# Patient Record
Sex: MALE | Race: OTHER | URBAN - METROPOLITAN AREA
[De-identification: names, ages, dates, MRNs, and addresses within clinical notes are randomized per-mention and may not be internally consistent; named-entity substitution may affect disease eponyms.]

---

## 2017-08-14 ENCOUNTER — EMERGENCY (EMERGENCY)
Facility: HOSPITAL | Age: 52
LOS: 0 days | Discharge: HOME | End: 2017-08-14
Attending: EMERGENCY MEDICINE
Payer: SELF-PAY

## 2017-08-14 VITALS
OXYGEN SATURATION: 97 % | HEART RATE: 97 BPM | TEMPERATURE: 99 F | WEIGHT: 184.97 LBS | DIASTOLIC BLOOD PRESSURE: 95 MMHG | HEIGHT: 70 IN | SYSTOLIC BLOOD PRESSURE: 141 MMHG | RESPIRATION RATE: 20 BRPM

## 2017-08-14 DIAGNOSIS — Z88.9 ALLERGY STATUS TO UNSPECIFIED DRUGS, MEDICAMENTS AND BIOLOGICAL SUBSTANCES: ICD-10-CM

## 2017-08-14 DIAGNOSIS — F10.929 ALCOHOL USE, UNSPECIFIED WITH INTOXICATION, UNSPECIFIED: ICD-10-CM

## 2017-08-14 PROCEDURE — 99283 EMERGENCY DEPT VISIT LOW MDM: CPT

## 2017-08-14 NOTE — ED ADULT NURSE NOTE - OBJECTIVE STATEMENT
pt was found in his car covered with vomitus, brought in for intoxication , pt axox3 states he drank only 6 beers today with shrimp that made him to throw up , not in any distress, will monitor,.

## 2017-08-14 NOTE — ED PROVIDER NOTE - OBJECTIVE STATEMENT
53yo male with no pertinent pmh, BIBEMS, presents with alcohol intox. Pt was found by bystander sleeping in car with vomitus. Pt admits to drinking today. Pt denies any symptoms at present. Pt reports was waiting for child to pick him up. Pt is now AOx3, no trauma noted.     ROS: No fever/chills. No photophobia/eye pain/changes in vision, No ear pain/sore throat/dysphagia, No chest pain/palpitations. No SOB/cough/stridor. No abdominal pain, N/V/D, no black/bloody bm. No dysuria/frequency/discharge, No headache. No Dizziness.  No rash.  No numbness/tingling/weakness. 53yo male with no pertinent pmh, BIBEMS, presents with alcohol intox. Pt was found by bystander sleeping in car with vomitus. Pt admits to drinking today. Pt denies any symptoms at present. Pt reports was waiting for child to pick him up. Pt is now AOx3, no trauma noted. d/w son: [phone], states no safetey issues at home and will come pick pt up.    ROS: No fever/chills. No photophobia/eye pain/changes in vision, No ear pain/sore throat/dysphagia, No chest pain/palpitations. No SOB/cough/stridor. No abdominal pain, N/V/D, no black/bloody bm. No dysuria/frequency/discharge, No headache. No Dizziness.  No rash.  No numbness/tingling/weakness.

## 2017-08-14 NOTE — ED ADULT TRIAGE NOTE - CHIEF COMPLAINT QUOTE
Pt ETOH BIBA found sleeping in car covered  in vomit. Pt  states " I drank a few beers, I was waiting for my kid to pick me up."

## 2017-08-14 NOTE — ED ADULT NURSE NOTE - ED STAT RN HANDOFF DETAILS
pt stable , not in any distress, pt axox3, re-eval by md , discharged home, son sent in a taxi to pick him up , pt left ed in a stable condition via taxi.

## 2017-10-23 PROBLEM — Z00.00 ENCOUNTER FOR PREVENTIVE HEALTH EXAMINATION: Status: ACTIVE | Noted: 2017-10-23

## 2021-12-29 ENCOUNTER — APPOINTMENT (OUTPATIENT)
Dept: UROLOGY | Facility: CLINIC | Age: 56
End: 2021-12-29
Payer: MEDICAID

## 2021-12-29 DIAGNOSIS — Z78.9 OTHER SPECIFIED HEALTH STATUS: ICD-10-CM

## 2021-12-29 PROCEDURE — 99203 OFFICE O/P NEW LOW 30 MIN: CPT

## 2021-12-29 NOTE — ASSESSMENT
[FreeTextEntry1] : 55 yo male with DM presenting with phimosis, interested in circumcision. \par \par -- discussed indication, risks and benefits of circumcision\par -- I discussed the risks, benefits and alternatives to circumcision with the patient, including but not limited to bleeding, infection, pain, irritation of the glans, and penile injury.  I also discussed the need to refrain from sexual intercourse for 6 weeks, or until the circumcision incision heals.  The patient wishes to proceed and we will schedule the surgery for the near future.\par -- patient to obtain medical clearance from PCP \par -- plan for OR circumcision

## 2021-12-29 NOTE — HISTORY OF PRESENT ILLNESS
[FreeTextEntry1] : 54 year old male with phimosis presenting for initial visit for circumcision. He is uncircumcised and states for "the longest time" he has been having issues with pain and cracking of the foreskin. States it is worse with sexual intercourse. Shows picture of penis with band of foreskin that he states cuts off the circulation with erection. Denies issues with urination. Interested in circumcision. \par \par PMH: Diabetes, \par Medication: Metformin, ?blood thinner\par PSxH: none\par Allergies: none\par Denies tobacco use, occasional alcohol use. \par Presently not working, taking care of mom. \par Lives with mom. \par

## 2021-12-29 NOTE — PHYSICAL EXAM
[General Appearance - Well Developed] : well developed [General Appearance - Well Nourished] : well nourished [Normal Appearance] : normal appearance [Well Groomed] : well groomed [General Appearance - In No Acute Distress] : no acute distress [Edema] : no peripheral edema [Respiration, Rhythm And Depth] : normal respiratory rhythm and effort [Exaggerated Use Of Accessory Muscles For Inspiration] : no accessory muscle use [Abdomen Soft] : soft [Abdomen Tenderness] : non-tender [Costovertebral Angle Tenderness] : no ~M costovertebral angle tenderness [Urethral Meatus] : meatus normal [Urinary Bladder Findings] : the bladder was normal on palpation [Scrotum] : the scrotum was normal [Testes Mass (___cm)] : there were no testicular masses [No Prostate Nodules] : no prostate nodules [Normal Station and Gait] : the gait and station were normal for the patient's age [] : no rash [No Focal Deficits] : no focal deficits [Oriented To Time, Place, And Person] : oriented to person, place, and time [Affect] : the affect was normal [Mood] : the mood was normal [Not Anxious] : not anxious [No Palpable Adenopathy] : no palpable adenopathy [FreeTextEntry1] : uncircumcised, scarred phimotic foreskin

## 2022-01-04 ENCOUNTER — OUTPATIENT (OUTPATIENT)
Dept: OUTPATIENT SERVICES | Facility: HOSPITAL | Age: 57
LOS: 1 days | End: 2022-01-04
Payer: MEDICAID

## 2022-01-04 VITALS
DIASTOLIC BLOOD PRESSURE: 87 MMHG | SYSTOLIC BLOOD PRESSURE: 142 MMHG | HEART RATE: 99 BPM | TEMPERATURE: 98 F | OXYGEN SATURATION: 98 % | RESPIRATION RATE: 18 BRPM | WEIGHT: 194.01 LBS | HEIGHT: 70 IN

## 2022-01-04 DIAGNOSIS — N47.1 PHIMOSIS: ICD-10-CM

## 2022-01-04 DIAGNOSIS — Z01.818 ENCOUNTER FOR OTHER PREPROCEDURAL EXAMINATION: ICD-10-CM

## 2022-01-04 PROCEDURE — G0463: CPT

## 2022-01-04 PROCEDURE — 87086 URINE CULTURE/COLONY COUNT: CPT

## 2022-01-04 NOTE — H&P PST ADULT - HISTORY OF PRESENT ILLNESS
This is a 55 y/o male c/o phimosis, he presents today for circumcision  denies recent travel, sick contact, covid symptoms.    covid swab to be done  1/10 at [facility]

## 2022-01-05 LAB
CULTURE RESULTS: SIGNIFICANT CHANGE UP
SPECIMEN SOURCE: SIGNIFICANT CHANGE UP

## 2022-01-12 ENCOUNTER — TRANSCRIPTION ENCOUNTER (OUTPATIENT)
Age: 57
End: 2022-01-12

## 2022-01-13 ENCOUNTER — OUTPATIENT (OUTPATIENT)
Dept: OUTPATIENT SERVICES | Facility: HOSPITAL | Age: 57
LOS: 1 days | End: 2022-01-13
Payer: MEDICAID

## 2022-01-13 ENCOUNTER — RESULT REVIEW (OUTPATIENT)
Age: 57
End: 2022-01-13

## 2022-01-13 ENCOUNTER — APPOINTMENT (OUTPATIENT)
Dept: UROLOGY | Facility: HOSPITAL | Age: 57
End: 2022-01-13

## 2022-01-13 VITALS
DIASTOLIC BLOOD PRESSURE: 74 MMHG | OXYGEN SATURATION: 99 % | SYSTOLIC BLOOD PRESSURE: 118 MMHG | RESPIRATION RATE: 16 BRPM | TEMPERATURE: 98 F | HEART RATE: 81 BPM

## 2022-01-13 VITALS
TEMPERATURE: 98 F | DIASTOLIC BLOOD PRESSURE: 75 MMHG | HEIGHT: 70 IN | WEIGHT: 194.01 LBS | SYSTOLIC BLOOD PRESSURE: 105 MMHG | OXYGEN SATURATION: 97 % | RESPIRATION RATE: 16 BRPM | HEART RATE: 102 BPM

## 2022-01-13 DIAGNOSIS — N47.1 PHIMOSIS: ICD-10-CM

## 2022-01-13 DIAGNOSIS — Z01.818 ENCOUNTER FOR OTHER PREPROCEDURAL EXAMINATION: ICD-10-CM

## 2022-01-13 LAB
GLUCOSE BLDC GLUCOMTR-MCNC: 158 MG/DL — HIGH (ref 70–99)
GLUCOSE BLDC GLUCOMTR-MCNC: 227 MG/DL — HIGH (ref 70–99)

## 2022-01-13 PROCEDURE — 88304 TISSUE EXAM BY PATHOLOGIST: CPT

## 2022-01-13 PROCEDURE — 82962 GLUCOSE BLOOD TEST: CPT

## 2022-01-13 PROCEDURE — 88304 TISSUE EXAM BY PATHOLOGIST: CPT | Mod: 26

## 2022-01-13 PROCEDURE — 54161 CIRCUM 28 DAYS OR OLDER: CPT

## 2022-01-13 RX ORDER — RAMIPRIL 5 MG
1 CAPSULE ORAL
Qty: 0 | Refills: 0 | DISCHARGE

## 2022-01-13 RX ORDER — SODIUM CHLORIDE 9 MG/ML
3 INJECTION INTRAMUSCULAR; INTRAVENOUS; SUBCUTANEOUS EVERY 8 HOURS
Refills: 0 | Status: DISCONTINUED | OUTPATIENT
Start: 2022-01-13 | End: 2022-01-13

## 2022-01-13 RX ORDER — ATORVASTATIN CALCIUM 80 MG/1
1 TABLET, FILM COATED ORAL
Qty: 0 | Refills: 0 | DISCHARGE

## 2022-01-13 RX ORDER — SODIUM CHLORIDE 9 MG/ML
1000 INJECTION, SOLUTION INTRAVENOUS
Refills: 0 | Status: DISCONTINUED | OUTPATIENT
Start: 2022-01-13 | End: 2022-01-20

## 2022-01-13 NOTE — ASU DISCHARGE PLAN (ADULT/PEDIATRIC) - CARE PROVIDER_API CALL
Antwon Holguin (MD)  Urology  [address], [address]  [place] [address]  Phone: [phone]  Fax: [phone]  Follow Up Time:

## 2022-01-13 NOTE — ASU DISCHARGE PLAN (ADULT/PEDIATRIC) - ASU DC SPECIAL INSTRUCTIONSFT
Circumcision Care:  BATHING: Please do not submerge wound underwater. You may sponge bathe until the dressing comes off and instructed by your surgeon. Keep incisions clean, apply bacitracin as needed.   ACTIVITY: No straddle activities such as bicycle riding. Otherwise, you may return to your usual level of physical activity.  DIET: Return to your usual diet, begin with liquid foods and progress slowly.  NOTIFY YOUR SURGEON IF: You have any bleeding that does not stop, any pus draining from your wound, any fever (over 100.4 F) or chills, persistent nausea/vomiting, persistent diarrhea, or if your pain is not controlled on your discharge pain medications.    FOLLOW-UP:  1. Follow-up with Dr. Holguin in 3 days, next Monday.  Please call office for appointment.

## 2022-01-13 NOTE — ASU DISCHARGE PLAN (ADULT/PEDIATRIC) - NS MD DC FALL RISK RISK
For information on Fall & Injury Prevention, visit: [email or web address] OR  [email or web address] OR  https://www.cdc.gov/steadi/patient.html

## 2022-01-13 NOTE — ASU PATIENT PROFILE, ADULT - FALL HARM RISK - UNIVERSAL INTERVENTIONS
Bed in lowest position, wheels locked, appropriate side rails in place/Call bell, personal items and telephone in reach/Instruct patient to call for assistance before getting out of bed or chair/Non-slip footwear when patient is out of bed/[place] to call system/Physically safe environment - no spills, clutter or unnecessary equipment/Purposeful Proactive Rounding/Room/bathroom lighting operational, light cord in reach

## 2022-01-18 LAB — SURGICAL PATHOLOGY STUDY: SIGNIFICANT CHANGE UP

## 2022-01-19 ENCOUNTER — APPOINTMENT (OUTPATIENT)
Dept: UROLOGY | Facility: CLINIC | Age: 57
End: 2022-01-19
Payer: MEDICAID

## 2022-01-19 DIAGNOSIS — R30.0 DYSURIA: ICD-10-CM

## 2022-01-19 PROBLEM — I10 ESSENTIAL (PRIMARY) HYPERTENSION: Chronic | Status: ACTIVE | Noted: 2022-01-04

## 2022-01-19 PROBLEM — E11.9 TYPE 2 DIABETES MELLITUS WITHOUT COMPLICATIONS: Chronic | Status: ACTIVE | Noted: 2022-01-04

## 2022-01-19 PROBLEM — E78.5 HYPERLIPIDEMIA, UNSPECIFIED: Chronic | Status: ACTIVE | Noted: 2022-01-04

## 2022-01-19 PROCEDURE — 99213 OFFICE O/P EST LOW 20 MIN: CPT | Mod: 24

## 2022-01-19 NOTE — HISTORY OF PRESENT ILLNESS
[FreeTextEntry1] : Very pleasant 56-year-old gentleman who presents for follow-up of phimosis status post circumcision and new complaint of dysuria.  He reports no problems after the circumcision 6 days ago.  He reports that the incision is healing well.  He reports that the stitches remain.  He is happy with the cosmetic outcome from the procedure.  \par \par Today he presents for evaluation of cute onset of dysuria.  He reports that when he urinates, especially in the morning, and the urine passes over the ventral aspect of the circumcision incision it is painful.  He denies hematuria.  No nausea or vomiting.  No other complaints.

## 2022-01-19 NOTE — PHYSICAL EXAM
[General Appearance - Well Developed] : well developed [General Appearance - Well Nourished] : well nourished [Normal Appearance] : normal appearance [Well Groomed] : well groomed [General Appearance - In No Acute Distress] : no acute distress [Abdomen Soft] : soft [Abdomen Tenderness] : non-tender [Costovertebral Angle Tenderness] : no ~M costovertebral angle tenderness [Urethral Meatus] : meatus normal [Urinary Bladder Findings] : the bladder was normal on palpation [Scrotum] : the scrotum was normal [Testes Mass (___cm)] : there were no testicular masses [Edema] : no peripheral edema [] : no respiratory distress [Respiration, Rhythm And Depth] : normal respiratory rhythm and effort [Exaggerated Use Of Accessory Muscles For Inspiration] : no accessory muscle use [Oriented To Time, Place, And Person] : oriented to person, place, and time [Affect] : the affect was normal [Mood] : the mood was normal [Not Anxious] : not anxious [Normal Station and Gait] : the gait and station were normal for the patient's age [No Focal Deficits] : no focal deficits [No Palpable Adenopathy] : no palpable adenopathy [FreeTextEntry1] : Circumcision incision healing well

## 2022-01-19 NOTE — ASSESSMENT
[FreeTextEntry1] : Very pleasant 56-year-old gentleman who presents for follow-up of phimosis status post circumcision, new complaint of dysuria\par -We discussed potential etiologies of dysuria\par -We discussed that the likely cause of his symptoms is the fresh incision from circumcision\par -Encouraged him to place bacitracin over the wound\par -Follow-up in 1 week

## 2022-01-21 ENCOUNTER — APPOINTMENT (OUTPATIENT)
Dept: UROLOGY | Facility: CLINIC | Age: 57
End: 2022-01-21

## 2022-04-22 ENCOUNTER — APPOINTMENT (OUTPATIENT)
Dept: UROLOGY | Facility: CLINIC | Age: 57
End: 2022-04-22
Payer: MEDICAID

## 2022-04-22 PROCEDURE — 99214 OFFICE O/P EST MOD 30 MIN: CPT

## 2022-04-22 NOTE — PHYSICAL EXAM
[General Appearance - Well Developed] : well developed [General Appearance - Well Nourished] : well nourished [Normal Appearance] : normal appearance [Well Groomed] : well groomed [General Appearance - In No Acute Distress] : no acute distress [Abdomen Soft] : soft [Abdomen Tenderness] : non-tender [Costovertebral Angle Tenderness] : no ~M costovertebral angle tenderness [Urethral Meatus] : meatus normal [Urinary Bladder Findings] : the bladder was normal on palpation [Scrotum] : the scrotum was normal [Testes Mass (___cm)] : there were no testicular masses [FreeTextEntry1] : Circumcision incision well-healed [Edema] : no peripheral edema [] : no respiratory distress [Respiration, Rhythm And Depth] : normal respiratory rhythm and effort [Exaggerated Use Of Accessory Muscles For Inspiration] : no accessory muscle use [Oriented To Time, Place, And Person] : oriented to person, place, and time [Affect] : the affect was normal [Mood] : the mood was normal [Not Anxious] : not anxious [Normal Station and Gait] : the gait and station were normal for the patient's age [No Focal Deficits] : no focal deficits [No Palpable Adenopathy] : no palpable adenopathy

## 2022-04-22 NOTE — HISTORY OF PRESENT ILLNESS
[FreeTextEntry1] : Very pleasant 56-year-old gentleman who presents for follow-up of phimosis status post circumcision, new complaints of premature ejaculation, erectile dysfunction, and concern for low testosterone.  He underwent a circumcision in January 2022.  He reports no problems after the circumcision.  He initially reported penile sensitivity, however this has nearly subsided.  He now reports problems with erections.  He also reports premature ejaculation.  He reports that he ejaculates within 2 to 3 minutes of vaginal penetration.  This is very bothersome.  He reports that he recently had his testosterone checked and was found to be 200.

## 2022-04-22 NOTE — ASSESSMENT
[FreeTextEntry1] : Very pleasant 56-year-old gentleman presents for follow-up of phimosis status post circumcision, new complaints of erectile dysfunction, premature ejaculation, concern for low testosterone\par -FSH\par -LH\par -Prolactin\par -SHBG\par -Testosterone testing prior to 10 AM\par -We had an extensive discussion regarding management options for premature ejaculation, including condom use, colitis interruptus, lidocaine–prilocaine cream, paroxetine and at this time he would like to try paroxetine\par -Trial of paroxetine 10 mg for premature ejaculation\par -We discussed the risks and benefits of paroxetine at length\par -We discussed options for management of erectile dysfunction, however he would like to wait until return of labs prior to initiating treatment for erectile dysfunction\par -Follow-up in 2 weeks\par

## 2022-04-26 LAB
FSH SERPL-MCNC: 3.9 IU/L
LH SERPL-ACNC: 2.3 IU/L
PROLACTIN SERPL-MCNC: 5.3 NG/ML

## 2022-04-28 LAB — SHBG SERPL-SCNC: 18.3 NMOL/L

## 2022-05-10 ENCOUNTER — APPOINTMENT (OUTPATIENT)
Dept: UROLOGY | Facility: CLINIC | Age: 57
End: 2022-05-10
Payer: MEDICAID

## 2022-05-10 LAB
TESTOST FREE SERPL-MCNC: 5.4 PG/ML
TESTOST SERPL-MCNC: 181 NG/DL

## 2022-05-10 PROCEDURE — 99214 OFFICE O/P EST MOD 30 MIN: CPT

## 2022-05-10 NOTE — ASSESSMENT
[FreeTextEntry1] : Very pleasant 57-year-old gentleman presents for follow-up of low testosterone, erectile dysfunction, premature ejaculation\par -Total testosterone 181 with a free testosterone of 5.4%\par -LH 2.3\par -FSH 3.9\par -Prolactin 5.3\par -We discussed options for management of testosterone deficiency.  We discussed option of testosterone replacement.  We discussed the risks and benefits of testosterone replacement, after a thorough discussion of these risks and benefits he wishes to forego testosterone replacement at this time\par -Trial of paroxetine for premature ejaculation\par -Follow-up in 3 months

## 2022-05-10 NOTE — HISTORY OF PRESENT ILLNESS
[FreeTextEntry1] : Very pleasant 57-year-old gentleman presents for follow-up of low testosterone, premature ejaculation, erectile dysfunction.  He reports that his erections are stable.  He reports good energy levels.  He reports that he is sleeping well.  He recently had testosterone checked which was found to be 181 total testosterone and 5.4 free testosterone.  He has not yet started paroxetine for premature ejaculation.

## 2022-08-16 ENCOUNTER — APPOINTMENT (OUTPATIENT)
Dept: UROLOGY | Facility: CLINIC | Age: 57
End: 2022-08-16

## 2022-08-31 ENCOUNTER — APPOINTMENT (OUTPATIENT)
Dept: UROLOGY | Facility: CLINIC | Age: 57
End: 2022-08-31

## 2022-08-31 VITALS
HEIGHT: 70 IN | TEMPERATURE: 97 F | HEART RATE: 100 BPM | SYSTOLIC BLOOD PRESSURE: 134 MMHG | WEIGHT: 190 LBS | OXYGEN SATURATION: 99 % | BODY MASS INDEX: 27.2 KG/M2 | DIASTOLIC BLOOD PRESSURE: 92 MMHG

## 2022-08-31 PROCEDURE — 99214 OFFICE O/P EST MOD 30 MIN: CPT

## 2022-09-01 NOTE — ASSESSMENT
[FreeTextEntry1] : Very pleasant 57-year-old gentleman who presents for follow-up of fatigue, erectile dysfunction, premature ejaculation\par -Total testosterone 181 with a free testosterone of 5.4%\par -LH 2.3\par -FSH 3.9\par -Prolactin 5.3\par -Repeat testosterone testing\par -PSA\par -A1c\par -Continue paroxetine for premature ejaculation- refill sent to the pharmacy\par -Discussed with patient management of erectile dysfunction at this time he wishes to defer treatment until results of laboratory evaluation returns

## 2022-09-02 ENCOUNTER — APPOINTMENT (OUTPATIENT)
Dept: UROLOGY | Facility: CLINIC | Age: 57
End: 2022-09-02

## 2022-09-02 VITALS
WEIGHT: 185 LBS | OXYGEN SATURATION: 98 % | TEMPERATURE: 94.2 F | BODY MASS INDEX: 26.48 KG/M2 | DIASTOLIC BLOOD PRESSURE: 95 MMHG | HEIGHT: 70 IN | HEART RATE: 97 BPM | SYSTOLIC BLOOD PRESSURE: 147 MMHG

## 2022-09-02 PROCEDURE — 99213 OFFICE O/P EST LOW 20 MIN: CPT

## 2022-09-02 NOTE — HISTORY OF PRESENT ILLNESS
[FreeTextEntry1] : Very pleasant 57-year-old gentleman presents for follow-up of low testosterone, premature ejaculation, erectile dysfunction.  He reports that his erections are stable.  He reports good energy levels.  He reports that he is sleeping well.  He previously had testosterone checked  in April 2022 which was found to be 181 total testosterone and 5.4 free testosterone.  He reports an improvement in his intravaginal latency time with paroxetine.\par \par He presents today to discuss further management options for erectile dysfunction.  He also wishes to have his blood work performed today.

## 2022-09-02 NOTE — ASSESSMENT
[FreeTextEntry1] : Very pleasant 57-year-old gentleman presents for follow-up of erectile dysfunction, premature ejaculation, low testosterone\par -PSA\par -Testosterone testing\par -Hemoglobin A1c\par -We discussed options for management of erectile dysfunction, and he would like to wait until results of laboratory evaluation returns prior to starting medication for erectile dysfunction\par -Continue paroxetine\par -Telehealth visit next week

## 2022-09-06 LAB
ESTIMATED AVERAGE GLUCOSE: 174 MG/DL
HBA1C MFR BLD HPLC: 7.7 %
PSA SERPL-MCNC: 2.24 NG/ML

## 2022-09-07 LAB
TESTOST FREE SERPL-MCNC: 10.3 PG/ML
TESTOST SERPL-MCNC: 355 NG/DL

## 2022-09-09 ENCOUNTER — APPOINTMENT (OUTPATIENT)
Dept: UROLOGY | Facility: CLINIC | Age: 57
End: 2022-09-09

## 2022-09-09 PROCEDURE — 99214 OFFICE O/P EST MOD 30 MIN: CPT | Mod: 95

## 2022-09-09 NOTE — HISTORY OF PRESENT ILLNESS
[FreeTextEntry1] : The patient-doctor relationship has been established in a face to face fashion via real time video/audio HIPAA compliant communication using telemedicine software.  He has requested care to be assessed and treated through telemedicine. He understands that there maybe limitations in this process and that he may need further follow up care in the office and/or hospital setting.\par \par Very pleasant 57-year-old gentleman presents for follow-up of erectile dysfunction, premature ejaculation.  Recently hemoglobin A1c was found to be 7.7.  PSA 2.24.  Total testosterone 355 with a free component of 10.3.  He still reports erectile dysfunction.  He reports that paroxetine significantly improves his intravaginal latency time.  He wishes to try a medication for erectile dysfunction.

## 2022-09-09 NOTE — ASSESSMENT
[FreeTextEntry1] : Very pleasant 57-year-old gentleman who presents for follow-up of premature ejaculation, erectile dysfunction, screening for prostate cancer, fatigue\par -Total testosterone 355, free testosterone 10.3\par -Hemoglobin A1c 7.7\par -PSA 2.24\par -Continue paroxetine\par -Trial of Cialis 5 mg prior to intercourse\par -I discussed the risks, benefits, alternatives, and possible side effects of Cialis (tadalafil) therapy with the patient, including but not limited to headache, flushing, upset stomach, blurry vision, change in color vision, vision loss, and priapism with the patient.\par -Follow-up in 1 month\par -Discussed the importance of improved diabetes control

## 2022-10-14 ENCOUNTER — APPOINTMENT (OUTPATIENT)
Dept: UROLOGY | Facility: CLINIC | Age: 57
End: 2022-10-14

## 2022-10-14 DIAGNOSIS — N47.1 PHIMOSIS: ICD-10-CM

## 2022-10-14 DIAGNOSIS — E11.9 TYPE 2 DIABETES MELLITUS W/OUT COMPLICATIONS: ICD-10-CM

## 2022-10-14 PROCEDURE — 99214 OFFICE O/P EST MOD 30 MIN: CPT | Mod: 95

## 2022-10-14 NOTE — ASSESSMENT
[FreeTextEntry1] : Very pleasant 57-year-old gentleman who presents for follow-up of erectile dysfunction, premature ejaculation\par -Continue Cialis for erectile dysfunction–refill sent to the pharmacy\par -Continue paroxetine for premature ejaculation\par -Total testosterone 355, free testosterone 10.3\par -Hemoglobin A1c 7.7\par -PSA 2.24\par -Follow-up in 6 months or sooner if necessary

## 2022-10-14 NOTE — HISTORY OF PRESENT ILLNESS
[FreeTextEntry1] : The patient-doctor relationship has been established in a face to face fashion via real time video/audio HIPAA compliant communication using telemedicine software.  He has requested care to be assessed and treated through telemedicine. He understands that there maybe limitations in this process and that he may need further follow up care in the office and/or hospital setting.\par \par Very pleasant 57-year-old gentleman presents for follow-up of erectile dysfunction, premature ejaculation.  Recently hemoglobin A1c was found to be 7.7.  PSA 2.24.  Total testosterone 355 with a free component of 10.3.  He reports that paroxetine significantly improves his intravaginal latency time.  He reports that tadalafil significantly improves his erections.

## 2022-10-14 NOTE — PHYSICAL EXAM
Problem: Safety  Goal: Will remain free from injury  Outcome: PROGRESSING AS EXPECTED    Goal: Will remain free from falls  Outcome: PROGRESSING AS EXPECTED      Problem: Bowel/Gastric:  Goal: Normal bowel function is maintained or improved  Outcome: PROGRESSING AS EXPECTED    Goal: Will not experience complications related to bowel motility  Outcome: PROGRESSING AS EXPECTED      Problem: Skin Integrity  Goal: Risk for impaired skin integrity will decrease  Outcome: PROGRESSING AS EXPECTED      Problem: Communication  Goal: The ability to communicate needs accurately and effectively will improve  Outcome: PROGRESSING AS EXPECTED      Problem: Infection  Goal: Will remain free from infection  Outcome: PROGRESSING AS EXPECTED      Problem: Venous Thromboembolism (VTW)/Deep Vein Thrombosis (DVT) Prevention:  Goal: Patient will participate in Venous Thrombosis (VTE)/Deep Vein Thrombosis (DVT)Prevention Measures  Outcome: PROGRESSING AS EXPECTED      Problem: Knowledge Deficit  Goal: Knowledge of disease process/condition, treatment plan, diagnostic tests, and medications will improve  Outcome: PROGRESSING AS EXPECTED    Goal: Knowledge of the prescribed therapeutic regimen will improve  Outcome: PROGRESSING AS EXPECTED      Problem: Discharge Barriers/Planning  Goal: Patient's continuum of care needs will be met  Outcome: PROGRESSING AS EXPECTED      Problem: Respiratory:  Goal: Respiratory status will improve  Outcome: PROGRESSING AS EXPECTED      Problem: Urinary Elimination:  Goal: Ability to reestablish a normal urinary elimination pattern will improve  Outcome: PROGRESSING AS EXPECTED      Problem: Pain Management  Goal: Pain level will decrease to patient's comfort goal  Outcome: PROGRESSING AS EXPECTED      Problem: Mobility  Goal: Risk for activity intolerance will decrease  Outcome: PROGRESSING AS EXPECTED         [General Appearance - Well Developed] : well developed [General Appearance - Well Nourished] : well nourished [Normal Appearance] : normal appearance [Well Groomed] : well groomed [General Appearance - In No Acute Distress] : no acute distress [] : no respiratory distress [Respiration, Rhythm And Depth] : normal respiratory rhythm and effort [Exaggerated Use Of Accessory Muscles For Inspiration] : no accessory muscle use [Oriented To Time, Place, And Person] : oriented to person, place, and time [Affect] : the affect was normal [Mood] : the mood was normal [Not Anxious] : not anxious

## 2023-04-21 ENCOUNTER — APPOINTMENT (OUTPATIENT)
Dept: UROLOGY | Facility: CLINIC | Age: 58
End: 2023-04-21
Payer: MEDICAID

## 2023-04-21 PROCEDURE — 99214 OFFICE O/P EST MOD 30 MIN: CPT | Mod: 95

## 2023-04-21 NOTE — ASSESSMENT
[FreeTextEntry1] : Very pleasant 57-year-old gentleman who presents for follow-up of premature ejaculation, erectile dysfunction\par -Continue tadalafil for erectile dysfunction\par -Continue paroxetine for premature ejaculation\par -PSA 2.24; repeat in 3 months\par -A1c 7.7%, repeat in 3 months.\par -Testosterone 355, free 10.3\par -f/u in 3 months with bloodowork

## 2023-04-21 NOTE — HISTORY OF PRESENT ILLNESS
[Home] : at home, [unfilled] , at the time of the visit. [Medical Office: ([facility])___] : at the medical office located in  [Verbal consent obtained from patient] : the patient, [unfilled] [FreeTextEntry1] : The patient-doctor relationship has been established in a face to face fashion via real time video/audio HIPAA compliant communication using telemedicine software.  He has requested care to be assessed and treated through telemedicine. He understands that there maybe limitations in this process and that he may need further follow up care in the office and/or hospital setting.\par \par Very pleasant 57-year-old gentleman presents for follow-up of erectile dysfunction, premature ejaculation.  Hemoglobin A1c was found to be 7.7 from 9/202.  PSA 2.24.  Total testosterone 355 with a free component of 10.3.  He continues to report that paroxetine significantly improves his premature ejaculation. He reports that tadalafil significantly improves his erections. He is happy with his sexual function on these medications.

## 2023-07-05 ENCOUNTER — APPOINTMENT (OUTPATIENT)
Dept: UROLOGY | Facility: CLINIC | Age: 58
End: 2023-07-05

## 2024-03-01 ENCOUNTER — APPOINTMENT (OUTPATIENT)
Dept: UROLOGY | Facility: CLINIC | Age: 59
End: 2024-03-01
Payer: MEDICAID

## 2024-03-01 VITALS
HEART RATE: 95 BPM | BODY MASS INDEX: 26.48 KG/M2 | HEIGHT: 70 IN | DIASTOLIC BLOOD PRESSURE: 90 MMHG | SYSTOLIC BLOOD PRESSURE: 147 MMHG | OXYGEN SATURATION: 96 % | WEIGHT: 185 LBS | TEMPERATURE: 96.4 F

## 2024-03-01 DIAGNOSIS — R53.83 OTHER FATIGUE: ICD-10-CM

## 2024-03-01 DIAGNOSIS — F52.4 PREMATURE EJACULATION: ICD-10-CM

## 2024-03-01 DIAGNOSIS — R79.89 OTHER SPECIFIED ABNORMAL FINDINGS OF BLOOD CHEMISTRY: ICD-10-CM

## 2024-03-01 DIAGNOSIS — N52.9 MALE ERECTILE DYSFUNCTION, UNSPECIFIED: ICD-10-CM

## 2024-03-01 PROCEDURE — G2211 COMPLEX E/M VISIT ADD ON: CPT | Mod: NC,1L

## 2024-03-01 PROCEDURE — 99214 OFFICE O/P EST MOD 30 MIN: CPT

## 2024-03-01 RX ORDER — PAROXETINE HYDROCHLORIDE 10 MG/1
10 TABLET, FILM COATED ORAL DAILY
Qty: 90 | Refills: 1 | Status: ACTIVE | COMMUNITY
Start: 2022-04-22 | End: 1900-01-01

## 2024-03-01 RX ORDER — TADALAFIL 5 MG/1
5 TABLET ORAL
Qty: 30 | Refills: 0 | Status: ACTIVE | COMMUNITY
Start: 2022-09-12 | End: 1900-01-01

## 2024-03-01 NOTE — ASSESSMENT
[FreeTextEntry1] : Very pleasant 58-year-old gentleman who presents for follow-up of premature ejaculation, erectile dysfunction, concern for low testosterone, fatigue -Check testosterone -Check PSA -Check A1c -Refill for paroxetine sent to the pharmacy -Refill for tadalafil sent to the pharmacy -Follow-up in 6 months or sooner if necessary  Patient is being seen today for evaluation and management of a chronic and longitudinal ongoing condition and I am of the primary treating physician

## 2024-03-01 NOTE — HISTORY OF PRESENT ILLNESS
[FreeTextEntry1] : Very pleasant 58-year-old gentleman who presents for follow-up of premature ejaculation, low testosterone, fatigue, erectile dysfunction.  He reports that tadalafil continues to improve his erections.  He also reports that paroxetine continues to improve his intravaginal latency time.  He denies problems with the medications.  He is interested in obtaining refills for these medications.

## 2024-03-01 NOTE — PHYSICAL EXAM
[Normal Appearance] : normal appearance [Well Groomed] : well groomed [General Appearance - In No Acute Distress] : no acute distress [Edema] : no peripheral edema [Respiration, Rhythm And Depth] : normal respiratory rhythm and effort [Abdomen Soft] : soft [Exaggerated Use Of Accessory Muscles For Inspiration] : no accessory muscle use [Abdomen Tenderness] : non-tender [Costovertebral Angle Tenderness] : no ~M costovertebral angle tenderness [Urinary Bladder Findings] : the bladder was normal on palpation [Normal Station and Gait] : the gait and station were normal for the patient's age [No Focal Deficits] : no focal deficits [] : no rash [Oriented To Time, Place, And Person] : oriented to person, place, and time [Affect] : the affect was normal [Mood] : the mood was normal [No Palpable Adenopathy] : no palpable adenopathy

## 2024-03-04 LAB
ESTIMATED AVERAGE GLUCOSE: 154 MG/DL
HBA1C MFR BLD HPLC: 7 %
PSA SERPL-MCNC: 2.3 NG/ML
TESTOST SERPL-MCNC: 190 NG/DL

## 2024-03-22 ENCOUNTER — APPOINTMENT (OUTPATIENT)
Dept: UROLOGY | Facility: CLINIC | Age: 59
End: 2024-03-22

## 2024-11-06 ENCOUNTER — APPOINTMENT (OUTPATIENT)
Dept: UROLOGY | Facility: CLINIC | Age: 59
End: 2024-11-06

## 2024-12-03 NOTE — H&P PST ADULT - FALL HARM RISK - PT AGE POPULATION HIDDEN
This form needs to be signed, dated and completed. Please fax back to [phone]. Form placed in mailbox.  Placed in mailbox 12/03/2024. Thank you   Adult

## 2024-12-13 ENCOUNTER — APPOINTMENT (OUTPATIENT)
Dept: UROLOGY | Facility: CLINIC | Age: 59
End: 2024-12-13

## 2025-02-11 ENCOUNTER — APPOINTMENT (OUTPATIENT)
Dept: UROLOGY | Facility: CLINIC | Age: 60
End: 2025-02-11
Payer: MEDICAID

## 2025-02-11 VITALS
SYSTOLIC BLOOD PRESSURE: 120 MMHG | HEART RATE: 80 BPM | DIASTOLIC BLOOD PRESSURE: 84 MMHG | HEIGHT: 70 IN | TEMPERATURE: 96.8 F | WEIGHT: 185 LBS | OXYGEN SATURATION: 98 % | BODY MASS INDEX: 26.48 KG/M2

## 2025-02-11 DIAGNOSIS — R79.89 OTHER SPECIFIED ABNORMAL FINDINGS OF BLOOD CHEMISTRY: ICD-10-CM

## 2025-02-11 DIAGNOSIS — E11.9 TYPE 2 DIABETES MELLITUS W/OUT COMPLICATIONS: ICD-10-CM

## 2025-02-11 DIAGNOSIS — F52.4 PREMATURE EJACULATION: ICD-10-CM

## 2025-02-11 DIAGNOSIS — N47.1 PHIMOSIS: ICD-10-CM

## 2025-02-11 DIAGNOSIS — N52.9 MALE ERECTILE DYSFUNCTION, UNSPECIFIED: ICD-10-CM

## 2025-02-11 DIAGNOSIS — R53.83 OTHER FATIGUE: ICD-10-CM

## 2025-02-11 PROCEDURE — 99214 OFFICE O/P EST MOD 30 MIN: CPT

## 2025-02-11 PROCEDURE — G2211 COMPLEX E/M VISIT ADD ON: CPT | Mod: NC

## 2025-03-14 ENCOUNTER — APPOINTMENT (OUTPATIENT)
Dept: UROLOGY | Facility: CLINIC | Age: 60
End: 2025-03-14

## 2025-04-02 ENCOUNTER — APPOINTMENT (OUTPATIENT)
Dept: UROLOGY | Facility: CLINIC | Age: 60
End: 2025-04-02
Payer: MEDICAID

## 2025-04-02 VITALS
DIASTOLIC BLOOD PRESSURE: 83 MMHG | HEIGHT: 70 IN | TEMPERATURE: 96.8 F | HEART RATE: 80 BPM | OXYGEN SATURATION: 98 % | SYSTOLIC BLOOD PRESSURE: 124 MMHG | WEIGHT: 185 LBS | BODY MASS INDEX: 26.48 KG/M2

## 2025-04-02 DIAGNOSIS — E11.9 TYPE 2 DIABETES MELLITUS W/OUT COMPLICATIONS: ICD-10-CM

## 2025-04-02 DIAGNOSIS — N13.8 BENIGN PROSTATIC HYPERPLASIA WITH LOWER URINARY TRACT SYMPMS: ICD-10-CM

## 2025-04-02 DIAGNOSIS — R53.83 OTHER FATIGUE: ICD-10-CM

## 2025-04-02 DIAGNOSIS — N40.1 BENIGN PROSTATIC HYPERPLASIA WITH LOWER URINARY TRACT SYMPMS: ICD-10-CM

## 2025-04-02 DIAGNOSIS — F52.4 PREMATURE EJACULATION: ICD-10-CM

## 2025-04-02 DIAGNOSIS — N52.9 MALE ERECTILE DYSFUNCTION, UNSPECIFIED: ICD-10-CM

## 2025-04-02 PROCEDURE — 99214 OFFICE O/P EST MOD 30 MIN: CPT

## 2025-04-02 PROCEDURE — G2211 COMPLEX E/M VISIT ADD ON: CPT | Mod: NC

## 2025-04-02 RX ORDER — TAMSULOSIN HYDROCHLORIDE 0.4 MG/1
0.4 CAPSULE ORAL
Qty: 30 | Refills: 1 | Status: ACTIVE | COMMUNITY
Start: 2025-04-02 | End: 1900-01-01

## 2025-05-02 ENCOUNTER — APPOINTMENT (OUTPATIENT)
Dept: UROLOGY | Facility: CLINIC | Age: 60
End: 2025-05-02
Payer: MEDICAID

## 2025-05-02 DIAGNOSIS — F52.4 PREMATURE EJACULATION: ICD-10-CM

## 2025-05-02 DIAGNOSIS — N40.1 BENIGN PROSTATIC HYPERPLASIA WITH LOWER URINARY TRACT SYMPMS: ICD-10-CM

## 2025-05-02 DIAGNOSIS — N13.8 BENIGN PROSTATIC HYPERPLASIA WITH LOWER URINARY TRACT SYMPMS: ICD-10-CM

## 2025-05-02 DIAGNOSIS — R53.83 OTHER FATIGUE: ICD-10-CM

## 2025-05-02 DIAGNOSIS — N52.9 MALE ERECTILE DYSFUNCTION, UNSPECIFIED: ICD-10-CM

## 2025-05-02 PROCEDURE — G2211 COMPLEX E/M VISIT ADD ON: CPT | Mod: NC,95

## 2025-05-02 PROCEDURE — 99214 OFFICE O/P EST MOD 30 MIN: CPT | Mod: 95

## (undated) DEVICE — DRSG KLING 2"

## (undated) DEVICE — FOR-ESU VALLEYLAB T7E15009DX: Type: DURABLE MEDICAL EQUIPMENT

## (undated) DEVICE — DRSG XEROFORM 1"

## (undated) DEVICE — SOL IRR POUR NS 0.9% 1500ML

## (undated) DEVICE — BLANKET WARMER UPPER ADULT

## (undated) DEVICE — ELCTR GROUNDING PAD ADULT COVIDIEN

## (undated) DEVICE — DRSG COBAN 4"

## (undated) DEVICE — PACK MINOR NO DRAPE

## (undated) DEVICE — WRAP COMPRESSION CALF MED

## (undated) DEVICE — DRAPE LAPAROTOMY W POUCHES

## (undated) DEVICE — NDL HYPO SAFE 25G X 1.5"

## (undated) DEVICE — SUT CHROMIC 3-0 27" P-12

## (undated) DEVICE — SUT CHROMIC 4-0 18" P-12